# Patient Record
Sex: FEMALE | Race: WHITE | NOT HISPANIC OR LATINO | URBAN - METROPOLITAN AREA
[De-identification: names, ages, dates, MRNs, and addresses within clinical notes are randomized per-mention and may not be internally consistent; named-entity substitution may affect disease eponyms.]

---
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
## 2025-07-16 ENCOUNTER — RX ONLY (RX ONLY)
Age: 20
End: 2025-07-16

## 2025-07-16 RX ORDER — CLINDAMYCIN PHOSPHATE 10 MG/ML
THIN LAYER LOTION TOPICAL
Qty: 60 | Refills: 0 | Status: ERX

## 2025-07-16 RX ORDER — TRIFAROTENE 50 UG/G
PEA SIZE CREAM TOPICAL NIGHTLY
Qty: 45 | Refills: 0 | Status: ERX